# Patient Record
Sex: FEMALE | Race: WHITE | URBAN - METROPOLITAN AREA
[De-identification: names, ages, dates, MRNs, and addresses within clinical notes are randomized per-mention and may not be internally consistent; named-entity substitution may affect disease eponyms.]

---

## 2018-05-15 ENCOUNTER — APPOINTMENT (OUTPATIENT)
Age: 73
Setting detail: DERMATOLOGY
End: 2018-05-21

## 2018-05-15 DIAGNOSIS — D69.2 OTHER NONTHROMBOCYTOPENIC PURPURA: ICD-10-CM

## 2018-05-15 DIAGNOSIS — I83.1 VARICOSE VEINS OF LOWER EXTREMITIES WITH INFLAMMATION: ICD-10-CM

## 2018-05-15 DIAGNOSIS — L82.1 OTHER SEBORRHEIC KERATOSIS: ICD-10-CM

## 2018-05-15 PROBLEM — I83.11 VARICOSE VEINS OF RIGHT LOWER EXTREMITY WITH INFLAMMATION: Status: ACTIVE | Noted: 2018-05-15

## 2018-05-15 PROBLEM — I83.12 VARICOSE VEINS OF LEFT LOWER EXTREMITY WITH INFLAMMATION: Status: ACTIVE | Noted: 2018-05-15

## 2018-05-15 PROCEDURE — OTHER IN-HOUSE DISPENSING PHARMACY: OTHER

## 2018-05-15 PROCEDURE — OTHER MIPS QUALITY: OTHER

## 2018-05-15 PROCEDURE — OTHER TREATMENT REGIMEN: OTHER

## 2018-05-15 PROCEDURE — 99202 OFFICE O/P NEW SF 15 MIN: CPT

## 2018-05-15 PROCEDURE — OTHER COUNSELING: OTHER

## 2018-05-15 ASSESSMENT — LOCATION DETAILED DESCRIPTION DERM
LOCATION DETAILED: RIGHT DISTAL DORSAL FOREARM
LOCATION DETAILED: LEFT DISTAL DORSAL FOREARM
LOCATION DETAILED: RIGHT RADIAL DORSAL HAND
LOCATION DETAILED: LEFT ULNAR DORSAL HAND
LOCATION DETAILED: RIGHT PROXIMAL PRETIBIAL REGION
LOCATION DETAILED: LEFT PROXIMAL PRETIBIAL REGION
LOCATION DETAILED: RIGHT PROXIMAL DORSAL FOREARM
LOCATION DETAILED: LEFT PROXIMAL DORSAL FOREARM

## 2018-05-15 ASSESSMENT — LOCATION SIMPLE DESCRIPTION DERM
LOCATION SIMPLE: RIGHT PRETIBIAL REGION
LOCATION SIMPLE: RIGHT FOREARM
LOCATION SIMPLE: RIGHT HAND
LOCATION SIMPLE: LEFT FOREARM
LOCATION SIMPLE: LEFT HAND
LOCATION SIMPLE: LEFT PRETIBIAL REGION

## 2018-05-15 ASSESSMENT — LOCATION ZONE DERM
LOCATION ZONE: LEG
LOCATION ZONE: HAND
LOCATION ZONE: ARM

## 2018-05-15 NOTE — PROCEDURE: MIPS QUALITY
Quality 111:Pneumonia Vaccination Status For Older Adults: Pneumococcal Vaccination Previously Received
Detail Level: Generalized
Quality 110: Preventive Care And Screening: Influenza Immunization: Influenza Immunization Administered during Influenza season
Quality 226: Preventive Care And Screening: Tobacco Use: Screening And Cessation Intervention: Patient screened for tobacco and is a smoker AND received Cessation Counseling
Quality 431: Preventive Care And Screening: Unhealthy Alcohol Use - Screening: Patient screened for unhealthy alcohol use using a single question and scores less than 2 times per year

## 2018-05-15 NOTE — PROCEDURE: TREATMENT REGIMEN
Plan: .\\nAdvised to consult with Dr Simpson
Continue Regimen: Compression sock- wear daily\\n- has been wearing compression socks daily x 5 years
Detail Level: Simple

## 2018-05-15 NOTE — PROCEDURE: IN-HOUSE DISPENSING PHARMACY
Product 75 Price/Unit (In Dollars): 0
Product 51 Fernández/Unit (In Dollars): 40
Product 7 Price/Unit (In Dollars): 50
Product 26 Unit Type: mg
Product 14 Unit Type: bottle(s)
Product 5 Unit Type: grams
Detail Level: Zone
Name Of Product 11: Metronidazole
Product 3 Amount/Unit (Numbers Only): 30
Product 3 Price/Unit (In Dollars): 60
Product 53 Refills: 12
Product 14 Application Directions: Apply pea size to affected area nightly
Product 10 Amount/Unit (Numbers Only): 1
Name Of Product 52: Azelaic acid foam
Product 6 Refills: 5
Product 11 Application Directions: Apply to face every morning
Name Of Product 23: 8% emulsion
Product 21 Application Directions: Apply to arms 3x a week
Name Of Product 14: Urea 40%
Product 10 Application Directions: Apply to face daily
Name Of Product 31: Clobetasol
Name Of Product 53: Benzoyl peroxide
Product 23 Application Directions: Apply pea size to hyperpigmented areas nightly, apply sunscreen every morning
Product 21 Refills: 2
Name Of Product 12: 4% emulsion
Name Of Product 16: Urea
Name Of Product 41: Tazorac .05 gel
Product 31 Application Directions: Apply 1-2x a day for two weeks then as needed for flares
Product 2 Refills: 3
Product 41 Application Directions: Apply pea size to acne zones nightly
Product 7 Application Directions: Apply to face nightly
Product 53 Price/Unit (In Dollars): 14
Name Of Product 9: Rosacea foam
Name Of Product 13: Tazorac
Name Of Product 42: Tretinoin .025
Product 15 Application Directions: Apply once a day in the morning
Product 8 Application Directions: Apply BID to affected areas prn
Product 12 Application Directions: Apply to brown spots nightly x3 months
Product 42 Application Directions: Apply pea size to face nightly
Product 1 Application Directions: Apply to affected area two weeks on and two weeks off. Repeat
Send Charges To Patient Encounter: Yes
Product 51 Application Directions: Apply every morning
Product 4 Application Directions: Apply pea size to face
Name Of Product 8: Clobetasol cream
Product 53 Application Directions: Apply to back two to three times a week let sit for 5 min then rinse
Product 52 Application Directions: Apply to face Every night
Product 3 Application Directions: Apply to affected area nightly for two weeks take two weeks off then repeat
Product 13 Application Directions: Apply to affected area nightly
Name Of Product 2: Tretinoin .05% cream
Name Of Product 6: Hydroquinone 8%
Product 5 Application Directions: Apply pea sized amount to affected area nightly
Name Of Product 51: Metrogel
Product 2 Application Directions: Apply a pea size amount to arms at bedtime
Product 17 Application Directions: Apply to affected area nightly x 1 week, take 1 week off and repeat until 4 weeks of treatment is done
Product 6 Application Directions: Apply pea sized amount to face nightly
Name Of Product 3: Imiquimod
Product 16 Application Directions: Apply to feet nightly
Name Of Product 7: Tazorac 0.05%
Name Of Product 5: Hydroquinone 4%
Name Of Product 4: Tretinoin 0.25
Name Of Product 10: Metronidazole Gel

## 2018-07-25 ENCOUNTER — APPOINTMENT (OUTPATIENT)
Age: 73
Setting detail: DERMATOLOGY
End: 2018-08-03

## 2018-07-25 DIAGNOSIS — I83.89 VARICOSE VEINS OF LOWER EXTREMITIES WITH OTHER COMPLICATIONS: ICD-10-CM

## 2018-07-25 PROBLEM — I83.893 VARICOSE VEINS OF BILATERAL LOWER EXTREMITIES WITH OTHER COMPLICATIONS: Status: ACTIVE | Noted: 2018-07-25

## 2018-07-25 PROCEDURE — 93970 EXTREMITY STUDY: CPT

## 2018-07-25 PROCEDURE — OTHER DOPPLER US: OTHER

## 2018-07-25 PROCEDURE — 99213 OFFICE O/P EST LOW 20 MIN: CPT | Mod: 25

## 2018-07-25 PROCEDURE — OTHER COUNSELING: OTHER

## 2018-07-25 PROCEDURE — OTHER CEAP-C CLASSIFICATION: OTHER

## 2018-07-25 ASSESSMENT — LOCATION DETAILED DESCRIPTION DERM
LOCATION DETAILED: RIGHT DISTAL PRETIBIAL REGION
LOCATION DETAILED: LEFT ANTERIOR DISTAL THIGH
LOCATION DETAILED: LEFT PROXIMAL PRETIBIAL REGION
LOCATION DETAILED: RIGHT ANTERIOR PROXIMAL THIGH

## 2018-07-25 ASSESSMENT — LOCATION ZONE DERM: LOCATION ZONE: LEG

## 2018-07-25 ASSESSMENT — LOCATION SIMPLE DESCRIPTION DERM
LOCATION SIMPLE: RIGHT PRETIBIAL REGION
LOCATION SIMPLE: RIGHT THIGH
LOCATION SIMPLE: LEFT PRETIBIAL REGION
LOCATION SIMPLE: LEFT THIGH

## 2018-07-25 NOTE — HPI: VEIN EVALUATION
How Severe Is/Are Your Symptoms?: severe
Is This A New Presentation, Or A Follow-Up?: Vein Evaluation
Additional History: Family history of varicose veins.
Patient's Profession?: Retired

## 2018-07-25 NOTE — PROCEDURE: CEAP-C CLASSIFICATION
Left Dorsalis Pedis Pulse: 2 (Easily palpable)
Left Leg: Peripheral Vascular Disease?: No
Follow Up Instructions:: Bilateral duplex ultrasound venous reflux study was reviewed with the results and finalized a treatment plan. Preventive strategies include weight loss through diet and exercise and toning leg muscles. A venous fact sheet was given, which reviews venous anatomy/pathophysiology and treatment options. The pathophysiology of venous disease and potential treatment options were discussed in detail, especially the non-FDA status of foam sclerotherapy with its risks benefits and alternatives. The patient's questions were answered in full
Right Leg Circumference: medium
Detail Level: Zone

## 2019-05-03 ENCOUNTER — APPOINTMENT (RX ONLY)
Dept: URBAN - METROPOLITAN AREA CLINIC 167 | Facility: CLINIC | Age: 74
Setting detail: DERMATOLOGY
End: 2019-05-03

## 2019-05-03 DIAGNOSIS — L81.4 OTHER MELANIN HYPERPIGMENTATION: ICD-10-CM

## 2019-05-03 DIAGNOSIS — D69.2 OTHER NONTHROMBOCYTOPENIC PURPURA: ICD-10-CM

## 2019-05-03 DIAGNOSIS — L82.1 OTHER SEBORRHEIC KERATOSIS: ICD-10-CM

## 2019-05-03 PROBLEM — E03.9 HYPOTHYROIDISM, UNSPECIFIED: Status: ACTIVE | Noted: 2019-05-03

## 2019-05-03 PROBLEM — Z85.828 PERSONAL HISTORY OF OTHER MALIGNANT NEOPLASM OF SKIN: Status: ACTIVE | Noted: 2019-05-03

## 2019-05-03 PROCEDURE — ? TREATMENT REGIMEN

## 2019-05-03 PROCEDURE — ? PRESCRIPTION

## 2019-05-03 PROCEDURE — ? COUNSELING

## 2019-05-03 PROCEDURE — 99202 OFFICE O/P NEW SF 15 MIN: CPT

## 2019-05-03 RX ORDER — HYDROQUINONE 4 %
CREAM (GRAM) TOPICAL
Qty: 1 | Refills: 4 | Status: ERX | COMMUNITY
Start: 2019-05-03

## 2019-05-03 RX ADMIN — Medication: at 21:35

## 2019-05-03 ASSESSMENT — SEVERITY ASSESSMENT
SEVERITY: MILD TO MODERATE
SEVERITY: MILD

## 2019-05-03 ASSESSMENT — LOCATION ZONE DERM: LOCATION ZONE: ARM

## 2019-05-03 ASSESSMENT — LOCATION SIMPLE DESCRIPTION DERM
LOCATION SIMPLE: LEFT FOREARM
LOCATION SIMPLE: RIGHT FOREARM

## 2019-05-03 ASSESSMENT — LOCATION DETAILED DESCRIPTION DERM
LOCATION DETAILED: LEFT PROXIMAL DORSAL FOREARM
LOCATION DETAILED: RIGHT PROXIMAL DORSAL FOREARM
LOCATION DETAILED: RIGHT DISTAL RADIAL DORSAL FOREARM
LOCATION DETAILED: LEFT DISTAL RADIAL DORSAL FOREARM

## 2019-05-03 ASSESSMENT — PAIN INTENSITY VAS: HOW INTENSE IS YOUR PAIN 0 BEING NO PAIN, 10 BEING THE MOST SEVERE PAIN POSSIBLE?: NO PAIN

## 2019-05-03 NOTE — PROCEDURE: TREATMENT REGIMEN
Initiate Treatment: Hydroquinone 4% cream to both hands and arms to help reduce the pigmentation.
Plan: Upon exam I advised patient the thinning of her skin is a derivative of her steroid medication.
Detail Level: Zone